# Patient Record
Sex: FEMALE | Race: WHITE | NOT HISPANIC OR LATINO | Employment: UNEMPLOYED | URBAN - METROPOLITAN AREA
[De-identification: names, ages, dates, MRNs, and addresses within clinical notes are randomized per-mention and may not be internally consistent; named-entity substitution may affect disease eponyms.]

---

## 2024-07-30 ENCOUNTER — HOSPITAL ENCOUNTER (EMERGENCY)
Facility: HOSPITAL | Age: 44
Discharge: HOME | End: 2024-07-30
Attending: EMERGENCY MEDICINE

## 2024-07-30 VITALS
BODY MASS INDEX: 23.54 KG/M2 | TEMPERATURE: 98.2 F | SYSTOLIC BLOOD PRESSURE: 116 MMHG | DIASTOLIC BLOOD PRESSURE: 73 MMHG | OXYGEN SATURATION: 97 % | HEIGHT: 67 IN | HEART RATE: 78 BPM | WEIGHT: 150 LBS | RESPIRATION RATE: 18 BRPM

## 2024-07-30 DIAGNOSIS — B35.6 TINEA CRURIS: Primary | ICD-10-CM

## 2024-07-30 PROCEDURE — 99283 EMERGENCY DEPT VISIT LOW MDM: CPT

## 2024-07-30 RX ORDER — CLOTRIMAZOLE AND BETAMETHASONE DIPROPIONATE 10; .64 MG/G; MG/G
1 CREAM TOPICAL 2 TIMES DAILY
Qty: 45 G | Refills: 0 | Status: SHIPPED | OUTPATIENT
Start: 2024-07-30

## 2024-07-30 NOTE — ED PROVIDER NOTES
Subjective   History of Present Illness  Patient is a 41-year-old female complaint of a rash in her groin that developed over the past several weeks.  She denies abdominal fever vomiting diarrhea or other complaint      Review of Systems    No past medical history on file.    Not on File    No past surgical history on file.    No family history on file.             Objective   Physical Exam  Abdomen is soft nontender.   exam shows the patient to have an excoriated erythematous rash in her inguinal folds extending over her groin.  No other abnormalities noted.  Procedures           ED Course                                             Medical Decision Making  Patient be discharged with a diagnosis of tinea cruris.  She will be placed on Lotrisone.  She will follow with her MD for recheck as needed.    Risk  Prescription drug management.        Final diagnoses:   Tinea cruris       ED Disposition  ED Disposition       ED Disposition   Discharge    Condition   Stable    Comment   --               No follow-up provider specified.       Medication List        New Prescriptions      clotrimazole-betamethasone 1-0.05 % cream  Commonly known as: LOTRISONE  Apply 1 Application topically to the appropriate area as directed 2 (Two) Times a Day.               Where to Get Your Medications        Information about where to get these medications is not yet available    Ask your nurse or doctor about these medications  clotrimazole-betamethasone 1-0.05 % cream            Jake Herrera MD  07/30/24 0699